# Patient Record
Sex: MALE | Race: BLACK OR AFRICAN AMERICAN | URBAN - METROPOLITAN AREA
[De-identification: names, ages, dates, MRNs, and addresses within clinical notes are randomized per-mention and may not be internally consistent; named-entity substitution may affect disease eponyms.]

---

## 2019-12-31 ENCOUNTER — HISTORICAL (OUTPATIENT)
Dept: ADMINISTRATIVE | Facility: HOSPITAL | Age: 44
End: 2019-12-31

## 2019-12-31 LAB
ABS NEUT (OLG): 4.28 X10(3)/MCL (ref 2.1–9.2)
ALBUMIN SERPL-MCNC: 3.4 GM/DL (ref 3.4–5)
ALBUMIN/GLOB SERPL: 1.1 {RATIO}
ALP SERPL-CCNC: 61 UNIT/L (ref 50–136)
ALT SERPL-CCNC: 27 UNIT/L (ref 12–78)
APPEARANCE, UA: CLEAR
AST SERPL-CCNC: 10 UNIT/L (ref 15–37)
BACTERIA SPEC CULT: ABNORMAL /HPF
BASOPHILS # BLD AUTO: 0 X10(3)/MCL (ref 0–0.2)
BASOPHILS NFR BLD AUTO: 1 %
BILIRUB SERPL-MCNC: 0.5 MG/DL (ref 0.2–1)
BILIRUB UR QL STRIP: NEGATIVE
BILIRUBIN DIRECT+TOT PNL SERPL-MCNC: 0.1 MG/DL (ref 0–0.2)
BILIRUBIN DIRECT+TOT PNL SERPL-MCNC: 0.4 MG/DL (ref 0–0.8)
BUN SERPL-MCNC: 20 MG/DL (ref 7–18)
CALCIUM SERPL-MCNC: 9.7 MG/DL (ref 8.5–10.1)
CHLORIDE SERPL-SCNC: 108 MMOL/L (ref 98–107)
CO2 SERPL-SCNC: 25 MMOL/L (ref 21–32)
COLOR UR: YELLOW
CREAT SERPL-MCNC: 0.96 MG/DL (ref 0.7–1.3)
CREAT UR-MCNC: 180 MG/DL
EOSINOPHIL # BLD AUTO: 0.1 X10(3)/MCL (ref 0–0.9)
EOSINOPHIL NFR BLD AUTO: 2 %
ERYTHROCYTE [DISTWIDTH] IN BLOOD BY AUTOMATED COUNT: 14 % (ref 11.5–17)
EST. AVERAGE GLUCOSE BLD GHB EST-MCNC: 134 MG/DL
GLOBULIN SER-MCNC: 3.1 GM/DL (ref 2.4–3.5)
GLUCOSE (UA): NEGATIVE
GLUCOSE SERPL-MCNC: 122 MG/DL (ref 74–106)
HBA1C MFR BLD: 6.3 % (ref 4.2–6.3)
HCT VFR BLD AUTO: 44.9 % (ref 42–52)
HGB BLD-MCNC: 15.1 GM/DL (ref 14–18)
HGB UR QL STRIP: NEGATIVE
KETONES UR QL STRIP: NEGATIVE
LEUKOCYTE ESTERASE UR QL STRIP: ABNORMAL
LYMPHOCYTES # BLD AUTO: 2.4 X10(3)/MCL (ref 0.6–4.6)
LYMPHOCYTES NFR BLD AUTO: 32 %
MCH RBC QN AUTO: 30.9 PG (ref 27–31)
MCHC RBC AUTO-ENTMCNC: 33.6 GM/DL (ref 33–36)
MCV RBC AUTO: 91.8 FL (ref 80–94)
MICROALBUMIN UR-MCNC: 1 MG/DL
MICROALBUMIN/CREAT RATIO PNL UR: 5.6 MG/GM CR (ref 0–30)
MONOCYTES # BLD AUTO: 0.7 X10(3)/MCL (ref 0.1–1.3)
MONOCYTES NFR BLD AUTO: 9 %
NEUTROPHILS # BLD AUTO: 4.28 X10(3)/MCL (ref 2.1–9.2)
NEUTROPHILS NFR BLD AUTO: 56 %
NITRITE UR QL STRIP: NEGATIVE
PH UR STRIP: 5.5 [PH] (ref 5–9)
PLATELET # BLD AUTO: 335 X10(3)/MCL (ref 130–400)
PMV BLD AUTO: 10.7 FL (ref 9.4–12.4)
POTASSIUM SERPL-SCNC: 3.9 MMOL/L (ref 3.5–5.1)
PROT SERPL-MCNC: 6.5 GM/DL (ref 6.4–8.2)
PROT UR QL STRIP: NEGATIVE
RBC # BLD AUTO: 4.89 X10(6)/MCL (ref 4.7–6.1)
RBC #/AREA URNS HPF: ABNORMAL /[HPF]
SODIUM SERPL-SCNC: 138 MMOL/L (ref 136–145)
SP GR UR STRIP: 1.02 (ref 1–1.03)
SQUAMOUS EPITHELIAL, UA: ABNORMAL
TSH SERPL-ACNC: 1.35 MIU/L (ref 0.36–3.74)
UROBILINOGEN UR STRIP-ACNC: 1
WBC # SPEC AUTO: 7.6 X10(3)/MCL (ref 4.5–11.5)
WBC #/AREA URNS HPF: 14 /HPF (ref 0–3)

## 2020-01-03 ENCOUNTER — HISTORICAL (OUTPATIENT)
Dept: ADMINISTRATIVE | Facility: HOSPITAL | Age: 45
End: 2020-01-03

## 2020-01-03 LAB
CHOLEST SERPL-MCNC: 208 MG/DL (ref 0–200)
CHOLEST/HDLC SERPL: 6.1 {RATIO} (ref 0–5)
HDLC SERPL-MCNC: 34 MG/DL (ref 35–60)
LDLC SERPL CALC-MCNC: 131 MG/DL (ref 0–129)
TRIGL SERPL-MCNC: 215 MG/DL (ref 30–150)
VLDLC SERPL CALC-MCNC: 43 MG/DL

## 2020-02-19 ENCOUNTER — HISTORICAL (OUTPATIENT)
Dept: ADMINISTRATIVE | Facility: HOSPITAL | Age: 45
End: 2020-02-19

## 2022-04-10 ENCOUNTER — HISTORICAL (OUTPATIENT)
Dept: ADMINISTRATIVE | Facility: HOSPITAL | Age: 47
End: 2022-04-10

## 2022-04-24 VITALS
WEIGHT: 281.31 LBS | BODY MASS INDEX: 39.38 KG/M2 | OXYGEN SATURATION: 93 % | DIASTOLIC BLOOD PRESSURE: 100 MMHG | HEIGHT: 71 IN | SYSTOLIC BLOOD PRESSURE: 144 MMHG

## 2024-09-30 ENCOUNTER — OFFICE VISIT (OUTPATIENT)
Dept: URGENT CARE | Facility: CLINIC | Age: 49
End: 2024-09-30

## 2024-09-30 VITALS
BODY MASS INDEX: 39.11 KG/M2 | HEIGHT: 71 IN | OXYGEN SATURATION: 98 % | SYSTOLIC BLOOD PRESSURE: 153 MMHG | TEMPERATURE: 98 F | DIASTOLIC BLOOD PRESSURE: 114 MMHG | HEART RATE: 90 BPM | RESPIRATION RATE: 18 BRPM | WEIGHT: 279.38 LBS

## 2024-09-30 DIAGNOSIS — R03.0 ELEVATED BLOOD PRESSURE READING: Primary | ICD-10-CM

## 2024-09-30 PROCEDURE — 99215 OFFICE O/P EST HI 40 MIN: CPT | Mod: PBBFAC | Performed by: FAMILY MEDICINE

## 2024-09-30 PROCEDURE — 99203 OFFICE O/P NEW LOW 30 MIN: CPT | Mod: S$PBB,,, | Performed by: FAMILY MEDICINE

## 2024-09-30 RX ORDER — AMLODIPINE BESYLATE 5 MG/1
5 TABLET ORAL DAILY
Qty: 30 TABLET | Refills: 3 | Status: SHIPPED | OUTPATIENT
Start: 2024-09-30

## 2024-09-30 NOTE — PROGRESS NOTES
"Subjective:       Patient ID: Yvonne Pierre is a 49 y.o. male.    Vitals:  height is 5' 11" (1.803 m) and weight is 126.7 kg (279 lb 6.4 oz). His oral temperature is 97.7 °F (36.5 °C). His blood pressure is 153/114 (abnormal) and his pulse is 90. His respiration is 18 and oxygen saturation is 98%.     Chief Complaint: Hypertension (Patient states had a work physical that he failed x 3 days. States does not take HTN or Diabetes meds. Needs PCP.)    Patient presents urgent care clinic requesting a PCP referral.  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  States a history of hypertension, off medications for quite some time.  Currently he is asymptomatic.  Denies chest pain or shortness on breath.  No headache or neuro symptoms.    All other systems are negative    Chart reviewed    Objective:   Physical Exam   Constitutional: He appears well-developed.  Non-toxic appearance. He does not appear ill. No distress.   Cardiovascular: Regular rhythm.   Pulmonary/Chest: Effort normal and breath sounds normal.   Abdominal: He exhibits no distension. Soft. There is no abdominal tenderness.   Musculoskeletal: Normal range of motion.         General: Normal range of motion.   Skin: Skin is warm, dry and not diaphoretic.   Nursing note and vitals reviewed.        Assessment:     1. Elevated blood pressure reading            Plan:   It sounds like he was on Norvasc in the past.  Will restart.  Refer to establish PCP for further evaluation and management.  ER precautions      Elevated blood pressure reading  -     amLODIPine (NORVASC) 5 MG tablet; Take 1 tablet (5 mg total) by mouth once daily.  Dispense: 30 tablet; Refill: 3  -     Ambulatory referral/consult to Internal Medicine        Please note: This chart was completed via voice to text dictation. It may contain typographical/word recognition errors. If there are any questions, please contact the provider for final " clarification.

## 2024-11-06 ENCOUNTER — OFFICE VISIT (OUTPATIENT)
Dept: INTERNAL MEDICINE | Facility: CLINIC | Age: 49
End: 2024-11-06
Payer: COMMERCIAL

## 2024-11-06 ENCOUNTER — LAB VISIT (OUTPATIENT)
Dept: LAB | Facility: HOSPITAL | Age: 49
End: 2024-11-06
Attending: NURSE PRACTITIONER
Payer: COMMERCIAL

## 2024-11-06 VITALS
OXYGEN SATURATION: 96 % | SYSTOLIC BLOOD PRESSURE: 158 MMHG | TEMPERATURE: 98 F | DIASTOLIC BLOOD PRESSURE: 98 MMHG | RESPIRATION RATE: 20 BRPM | WEIGHT: 274 LBS | BODY MASS INDEX: 38.36 KG/M2 | HEIGHT: 71 IN | HEART RATE: 79 BPM

## 2024-11-06 DIAGNOSIS — R73.9 ELEVATED BLOOD SUGAR: Primary | ICD-10-CM

## 2024-11-06 DIAGNOSIS — I10 PRIMARY HYPERTENSION: ICD-10-CM

## 2024-11-06 DIAGNOSIS — E89.0 HISTORY OF PARTIAL THYROIDECTOMY: ICD-10-CM

## 2024-11-06 DIAGNOSIS — E66.812 CLASS 2 SEVERE OBESITY WITH SERIOUS COMORBIDITY AND BODY MASS INDEX (BMI) OF 38.0 TO 38.9 IN ADULT, UNSPECIFIED OBESITY TYPE: ICD-10-CM

## 2024-11-06 DIAGNOSIS — R01.1 HEART MURMUR: ICD-10-CM

## 2024-11-06 DIAGNOSIS — Z11.9 SCREENING EXAMINATION FOR INFECTIOUS DISEASE: ICD-10-CM

## 2024-11-06 DIAGNOSIS — R73.9 ELEVATED BLOOD SUGAR: ICD-10-CM

## 2024-11-06 DIAGNOSIS — Z12.11 COLON CANCER SCREENING: ICD-10-CM

## 2024-11-06 DIAGNOSIS — E66.01 CLASS 2 SEVERE OBESITY WITH SERIOUS COMORBIDITY AND BODY MASS INDEX (BMI) OF 38.0 TO 38.9 IN ADULT, UNSPECIFIED OBESITY TYPE: ICD-10-CM

## 2024-11-06 DIAGNOSIS — Z00.00 WELLNESS EXAMINATION: ICD-10-CM

## 2024-11-06 DIAGNOSIS — Z12.5 PROSTATE CANCER SCREENING: ICD-10-CM

## 2024-11-06 LAB
25(OH)D3+25(OH)D2 SERPL-MCNC: 15 NG/ML (ref 30–80)
ALBUMIN SERPL-MCNC: 3.7 G/DL (ref 3.5–5)
ALBUMIN/GLOB SERPL: 1 RATIO (ref 1.1–2)
ALP SERPL-CCNC: 56 UNIT/L (ref 40–150)
ALT SERPL-CCNC: 18 UNIT/L (ref 0–55)
ANION GAP SERPL CALC-SCNC: 9 MEQ/L
AST SERPL-CCNC: 17 UNIT/L (ref 5–34)
BACTERIA #/AREA URNS AUTO: ABNORMAL /HPF
BASOPHILS # BLD AUTO: 0.07 X10(3)/MCL
BASOPHILS NFR BLD AUTO: 0.9 %
BILIRUB SERPL-MCNC: 0.5 MG/DL
BILIRUB UR QL STRIP.AUTO: NEGATIVE
BUN SERPL-MCNC: 10.3 MG/DL (ref 8.4–25.7)
CALCIUM SERPL-MCNC: 9.4 MG/DL (ref 8.4–10.2)
CHLORIDE SERPL-SCNC: 102 MMOL/L (ref 98–107)
CHOLEST SERPL-MCNC: 205 MG/DL
CHOLEST/HDLC SERPL: 5 {RATIO} (ref 0–5)
CLARITY UR: CLEAR
CO2 SERPL-SCNC: 25 MMOL/L (ref 22–29)
COLOR UR AUTO: ABNORMAL
CREAT SERPL-MCNC: 1.06 MG/DL (ref 0.72–1.25)
CREAT UR-MCNC: 172.7 MG/DL (ref 63–166)
CREAT/UREA NIT SERPL: 10
EOSINOPHIL # BLD AUTO: 0.1 X10(3)/MCL (ref 0–0.9)
EOSINOPHIL NFR BLD AUTO: 1.3 %
ERYTHROCYTE [DISTWIDTH] IN BLOOD BY AUTOMATED COUNT: 12.3 % (ref 11.5–17)
EST. AVERAGE GLUCOSE BLD GHB EST-MCNC: 226 MG/DL
GFR SERPLBLD CREATININE-BSD FMLA CKD-EPI: >60 ML/MIN/1.73/M2
GLOBULIN SER-MCNC: 3.6 GM/DL (ref 2.4–3.5)
GLUCOSE SERPL-MCNC: 220 MG/DL (ref 74–100)
GLUCOSE UR QL STRIP: ABNORMAL
HBA1C MFR BLD: 9.5 %
HBV SURFACE AG SERPL QL IA: NONREACTIVE
HCT VFR BLD AUTO: 44.7 % (ref 42–52)
HCV AB SERPL QL IA: NONREACTIVE
HDLC SERPL-MCNC: 40 MG/DL (ref 35–60)
HGB BLD-MCNC: 15.2 G/DL (ref 14–18)
HGB UR QL STRIP: NEGATIVE
HIV 1+2 AB+HIV1 P24 AG SERPL QL IA: NONREACTIVE
HYALINE CASTS #/AREA URNS LPF: ABNORMAL /LPF
IMM GRANULOCYTES # BLD AUTO: 0.02 X10(3)/MCL (ref 0–0.04)
IMM GRANULOCYTES NFR BLD AUTO: 0.3 %
KETONES UR QL STRIP: ABNORMAL
LDLC SERPL CALC-MCNC: 89 MG/DL (ref 50–140)
LEUKOCYTE ESTERASE UR QL STRIP: 250
LYMPHOCYTES # BLD AUTO: 2.3 X10(3)/MCL (ref 0.6–4.6)
LYMPHOCYTES NFR BLD AUTO: 31 %
MCH RBC QN AUTO: 30.3 PG (ref 27–31)
MCHC RBC AUTO-ENTMCNC: 34 G/DL (ref 33–36)
MCV RBC AUTO: 89.2 FL (ref 80–94)
MICROALBUMIN UR-MCNC: 27.7 UG/ML
MICROALBUMIN/CREAT RATIO PNL UR: 16 MG/GM CR (ref 0–30)
MONOCYTES # BLD AUTO: 0.71 X10(3)/MCL (ref 0.1–1.3)
MONOCYTES NFR BLD AUTO: 9.6 %
MUCOUS THREADS URNS QL MICRO: ABNORMAL /LPF
NEUTROPHILS # BLD AUTO: 4.21 X10(3)/MCL (ref 2.1–9.2)
NEUTROPHILS NFR BLD AUTO: 56.9 %
NITRITE UR QL STRIP: NEGATIVE
NRBC BLD AUTO-RTO: 0 %
PH UR STRIP: 5.5 [PH]
PLATELET # BLD AUTO: 313 X10(3)/MCL (ref 130–400)
PMV BLD AUTO: 10.3 FL (ref 7.4–10.4)
POTASSIUM SERPL-SCNC: 4 MMOL/L (ref 3.5–5.1)
PROT SERPL-MCNC: 7.3 GM/DL (ref 6.4–8.3)
PROT UR QL STRIP: ABNORMAL
PSA SERPL-MCNC: 0.84 NG/ML
RBC # BLD AUTO: 5.01 X10(6)/MCL (ref 4.7–6.1)
RBC #/AREA URNS AUTO: ABNORMAL /HPF
SODIUM SERPL-SCNC: 136 MMOL/L (ref 136–145)
SP GR UR STRIP.AUTO: 1.02 (ref 1–1.03)
SQUAMOUS #/AREA URNS LPF: ABNORMAL /HPF
T PALLIDUM AB SER QL: NONREACTIVE
TRIGL SERPL-MCNC: 378 MG/DL (ref 34–140)
TSH SERPL-ACNC: 0.74 UIU/ML (ref 0.35–4.94)
UROBILINOGEN UR STRIP-ACNC: NORMAL
VLDLC SERPL CALC-MCNC: 76 MG/DL
WBC # BLD AUTO: 7.41 X10(3)/MCL (ref 4.5–11.5)
WBC #/AREA URNS AUTO: ABNORMAL /HPF

## 2024-11-06 PROCEDURE — 87340 HEPATITIS B SURFACE AG IA: CPT

## 2024-11-06 PROCEDURE — 84443 ASSAY THYROID STIM HORMONE: CPT

## 2024-11-06 PROCEDURE — 82570 ASSAY OF URINE CREATININE: CPT

## 2024-11-06 PROCEDURE — 84153 ASSAY OF PSA TOTAL: CPT

## 2024-11-06 PROCEDURE — 86803 HEPATITIS C AB TEST: CPT

## 2024-11-06 PROCEDURE — 83036 HEMOGLOBIN GLYCOSYLATED A1C: CPT

## 2024-11-06 PROCEDURE — 86780 TREPONEMA PALLIDUM: CPT

## 2024-11-06 PROCEDURE — 82306 VITAMIN D 25 HYDROXY: CPT

## 2024-11-06 PROCEDURE — 85025 COMPLETE CBC W/AUTO DIFF WBC: CPT

## 2024-11-06 PROCEDURE — 80053 COMPREHEN METABOLIC PANEL: CPT

## 2024-11-06 PROCEDURE — 80061 LIPID PANEL: CPT

## 2024-11-06 PROCEDURE — 87389 HIV-1 AG W/HIV-1&-2 AB AG IA: CPT

## 2024-11-06 PROCEDURE — 36415 COLL VENOUS BLD VENIPUNCTURE: CPT

## 2024-11-06 PROCEDURE — 99214 OFFICE O/P EST MOD 30 MIN: CPT | Mod: PBBFAC | Performed by: NURSE PRACTITIONER

## 2024-11-06 PROCEDURE — 81015 MICROSCOPIC EXAM OF URINE: CPT

## 2024-11-06 NOTE — ASSESSMENT & PLAN NOTE
BMI 38.22  Educated on increased risk of disease s/t obesity.  Educated on health benefits of at least 5 days/ week of 30 minutes moderate intensity exercise (brisk walking) and 2 or more days/ week of muscle strength activities (as tolerated).  Eat well balanced diet of fresh fruits/ vegetables, whole grains, lean meats and limit high carbohydrate foods.

## 2024-11-06 NOTE — ASSESSMENT & PLAN NOTE
BP Readings from Last 3 Encounters:   11/06/24 (!) 158/98   09/30/24 (!) 153/114   06/03/20 (!) 144/100   Asymptomatic. Did not take medication this morning.  Follow low sodium diet, < 2 gm/day (avoid high salty foods such as processed meats/ sausage/grimaldo/ sandwich meat, chips, pickles, cheese, crackers and soft drinks/ electrolyte replacement drinks).  Avoid tobacco/ alcohol use  Educated on health benefits of at least 5 days/ week of 30 minutes moderate intensity exercise (brisk walking) and 2 or more days/ week of muscle strength activities  Daily ASA 81 mg for CV prevention  Continue current medication regimen  Has home BP monitor

## 2024-11-06 NOTE — ASSESSMENT & PLAN NOTE
Asymptomatic. Denies prior diagnosis. Echo ordered   Emphasized importance of antihypertensive compliance
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
## 2024-11-06 NOTE — ASSESSMENT & PLAN NOTE
New onset diabetes, need labs to confirm, do not have outside records  Per pt A1c around 10% and glucose in urine > 500  Has home glucometer but not checking sugar  Lab Results   Component Value Date    HGBA1C 6.3 12/31/2019     CBGs: not checking  Hypoglycemia episodes:  Microalbumin:   Lab Results   Component Value Date    MICALBCREAT 5.6 12/31/2019     Educated on ADA diet: eliminate/decrease high carbohydrate foods (rice, pasta, bread, potatoes (french fries, chips), candy, sweets, fruit juices, canned fruit, junk food, crackers, carbonated beverages)  Educated on health benefits of at least 5 days/ week of 30 minutes moderate intensity exercise (brisk walking) and 2 or more days/ week of muscle strength activities  Avoid alcohol or tobacco use  Eye exam:  Foot exam:  Normal sugar , < 200

## 2024-11-18 ENCOUNTER — TELEPHONE (OUTPATIENT)
Dept: INTERNAL MEDICINE | Facility: CLINIC | Age: 49
End: 2024-11-18
Payer: COMMERCIAL

## 2024-11-18 DIAGNOSIS — E11.9 NEW ONSET TYPE 2 DIABETES MELLITUS: Primary | ICD-10-CM

## 2024-11-18 RX ORDER — METFORMIN HYDROCHLORIDE 500 MG/1
500 TABLET ORAL 2 TIMES DAILY WITH MEALS
Qty: 180 TABLET | Refills: 0 | Status: SHIPPED | OUTPATIENT
Start: 2024-11-18 | End: 2025-11-18

## 2024-11-18 NOTE — TELEPHONE ENCOUNTER
----- Message from Gayle Sam NP sent at 11/18/2024 10:46 AM CST -----  Please notify pt lab and urine test results reviewed. A1c is 9.5% which means pt is diabetic (an A1c > or = 6.5% is considered diabetic). Encourage patient to make changes with dietary intake and lifestyle modifications (encourage exercise as tolerated) and encourage to check blood sugars to keep log. Recommend he start taking prescription medication called Metformin to help lower blood sugars. Medication must be taken with food as it can lower blood sugar especially if taken without food. Keep appointment as scheduled and will discuss further at upcoming visit.

## 2024-11-20 NOTE — TELEPHONE ENCOUNTER
Received return call back informed that A1C  9.5% meaning is a diabetic. Metformin 500 mg was sent to the pharmacy to be taken twice a day . Encouraged lifestyle changes in diet and exercise. Instructed to avoid the whites= rice, potatoes, pasta, bread, sugar. Read label  to decrease intake of  carbohydrates. Instructed to check blood sugar daily and keep a log. Keep all appointments. He voiced understanding.

## 2024-11-20 NOTE — TELEPHONE ENCOUNTER
Second attempt made to contact- unsuccessful. Called father number listed no answer left message to have son contact the clinic.

## 2024-12-11 ENCOUNTER — OFFICE VISIT (OUTPATIENT)
Dept: INTERNAL MEDICINE | Facility: CLINIC | Age: 49
End: 2024-12-11
Payer: COMMERCIAL

## 2024-12-11 DIAGNOSIS — E55.9 VITAMIN D DEFICIENCY: ICD-10-CM

## 2024-12-11 DIAGNOSIS — E11.9 TYPE 2 DIABETES MELLITUS WITHOUT COMPLICATION, WITH LONG-TERM CURRENT USE OF INSULIN: Primary | ICD-10-CM

## 2024-12-11 DIAGNOSIS — Z79.4 TYPE 2 DIABETES MELLITUS WITHOUT COMPLICATION, WITH LONG-TERM CURRENT USE OF INSULIN: Primary | ICD-10-CM

## 2024-12-11 PROCEDURE — 1159F MED LIST DOCD IN RCRD: CPT | Mod: CPTII,95,, | Performed by: NURSE PRACTITIONER

## 2024-12-11 PROCEDURE — 3066F NEPHROPATHY DOC TX: CPT | Mod: CPTII,95,, | Performed by: NURSE PRACTITIONER

## 2024-12-11 PROCEDURE — 3061F NEG MICROALBUMINURIA REV: CPT | Mod: CPTII,95,, | Performed by: NURSE PRACTITIONER

## 2024-12-11 PROCEDURE — 99214 OFFICE O/P EST MOD 30 MIN: CPT | Mod: 95,,, | Performed by: NURSE PRACTITIONER

## 2024-12-11 PROCEDURE — 1160F RVW MEDS BY RX/DR IN RCRD: CPT | Mod: CPTII,95,, | Performed by: NURSE PRACTITIONER

## 2024-12-11 PROCEDURE — 3046F HEMOGLOBIN A1C LEVEL >9.0%: CPT | Mod: CPTII,95,, | Performed by: NURSE PRACTITIONER

## 2024-12-11 RX ORDER — LANCETS
EACH MISCELLANEOUS
Qty: 100 EACH | Refills: 3 | Status: SHIPPED | OUTPATIENT
Start: 2024-12-11

## 2024-12-11 RX ORDER — CHOLECALCIFEROL (VITAMIN D3) 1250 MCG
1250 TABLET ORAL
Qty: 12 TABLET | Refills: 0 | Status: SHIPPED | OUTPATIENT
Start: 2024-12-11

## 2024-12-11 RX ORDER — INSULIN PUMP SYRINGE, 3 ML
EACH MISCELLANEOUS
Qty: 1 EACH | Refills: 0 | Status: SHIPPED | OUTPATIENT
Start: 2024-12-11

## 2024-12-11 NOTE — ASSESSMENT & PLAN NOTE
Lab Results   Component Value Date    HGBA1C 9.5 (H) 11/06/2024     CBGs:  Hypoglycemia episodes:  Microalbumin:   Lab Results   Component Value Date    MICALBCREAT 16.0 11/06/2024     Educated on ADA diet: eliminate/decrease high carbohydrate foods (rice, pasta, bread, potatoes (french fries, chips), candy, sweets, fruit juices, canned fruit, junk food, crackers, carbonated beverages)  Educated on health benefits of at least 5 days/ week of 30 minutes moderate intensity exercise (brisk walking) and 2 or more days/ week of muscle strength activities  Avoid alcohol or tobacco use  Eye exam:  Foot exam:  Per recommendations, continue with ACEI/ARB, Daily ASA 81 mg for CV prevention, Statin therapy  Continue with current regimen- Metformin rx last month, tolerating without w/e  Rx for supplies  F/u 2 mo with recheck of labs

## 2024-12-11 NOTE — ASSESSMENT & PLAN NOTE
Lab Results   Component Value Date    [id number] 15 (L) 11/06/2024   Rx vitamin D 3 21085 IU once weekly x 12 weeks  Educated on increasing foods high in Vitamin D such as mushrooms, fish oil, cod liver, salmon, tuna, egg yolks, milk fortified with Vitamin D and foods fortified with Vitamin D such as cereals and oatmeal.  Daily supplementation of Vitamin D 2000 IU daily in addition to Calcium supplementation 1000 mg- 1200 mg daily.

## 2024-12-11 NOTE — PROGRESS NOTES
Attempted to reach patient for virtual. No answer and unable to leave VM. Sent direct link for virtual. Patient has not arrived for patient 12/11/24 1410.    The patient location is: vehicle  The chief complaint leading to consultation is: lab review    Visit type: audiovisual    Face to Face time with patient: 7 minutes  11 minutes of total time spent on the encounter, which includes face to face time and non-face to face time preparing to see the patient (eg, review of tests), Obtaining and/or reviewing separately obtained history, Documenting clinical information in the electronic or other health record, Independently interpreting results (not separately reported) and communicating results to the patient/family/caregiver, or Care coordination (not separately reported).         Each patient to whom he or she provides medical services by telemedicine is:  (1) informed of the relationship between the physician and patient and the respective role of any other health care provider with respect to management of the patient; and (2) notified that he or she may decline to receive medical services by telemedicine and may withdraw from such care at any time.    Notes: Initial visit 11/6/24. Pt for follow up and lab review. A1c 9.5%. Taking Metformin and denies any associated s/e. Needs refill of test strips. Vit D 15. LDL 89 and trig 378. Denies any other concerns/ complaints.     Medication List with Changes/Refills   New Medications    BLOOD SUGAR DIAGNOSTIC STRP    To check BG one times daily, to use with insurance preferred meter    BLOOD-GLUCOSE METER KIT    To check BG one times daily, to use with insurance preferred meter    CHOLECALCIFEROL, VITAMIN D3, 1,250 MCG (50,000 UNIT) TAB    Take 1,250 mcg by mouth every 7 days.    LANCETS MISC    To check BG one times daily, to use with insurance preferred meter   Current Medications    AMLODIPINE (NORVASC) 5 MG TABLET    Take 1 tablet (5 mg total) by mouth once daily.     METFORMIN (GLUCOPHAGE) 500 MG TABLET    Take 1 tablet (500 mg total) by mouth 2 (two) times daily with meals.     Problem List Items Addressed This Visit          Endocrine    Type 2 diabetes mellitus without complication, with long-term current use of insulin - Primary    Current Assessment & Plan     Lab Results   Component Value Date    HGBA1C 9.5 (H) 11/06/2024     CBGs:  Hypoglycemia episodes:  Microalbumin:   Lab Results   Component Value Date    MICALBCREAT 16.0 11/06/2024     Educated on ADA diet: eliminate/decrease high carbohydrate foods (rice, pasta, bread, potatoes (french fries, chips), candy, sweets, fruit juices, canned fruit, junk food, crackers, carbonated beverages)  Educated on health benefits of at least 5 days/ week of 30 minutes moderate intensity exercise (brisk walking) and 2 or more days/ week of muscle strength activities  Avoid alcohol or tobacco use  Eye exam:  Foot exam:  Per recommendations, continue with ACEI/ARB, Daily ASA 81 mg for CV prevention, Statin therapy  Continue with current regimen- Metformin rx last month, tolerating without w/e  Rx for supplies  F/u 2 mo with recheck of labs            Relevant Medications    blood-glucose meter kit    lancets Misc    blood sugar diagnostic Strp    Other Relevant Orders    Hemoglobin A1C    Comprehensive Metabolic Panel    Vitamin D deficiency    Current Assessment & Plan     Lab Results   Component Value Date    [id number] 15 (L) 11/06/2024   Rx vitamin D 3 63303 IU once weekly x 12 weeks  Educated on increasing foods high in Vitamin D such as mushrooms, fish oil, cod liver, salmon, tuna, egg yolks, milk fortified with Vitamin D and foods fortified with Vitamin D such as cereals and oatmeal.  Daily supplementation of Vitamin D 2000 IU daily in addition to Calcium supplementation 1000 mg- 1200 mg daily.           Relevant Medications    cholecalciferol, vitamin D3, 1,250 mcg (50,000 unit) Tab     Follow up in about 2 months (around  2/11/2025) for DM with fasting labs .

## 2024-12-19 ENCOUNTER — TELEPHONE (OUTPATIENT)
Dept: INTERNAL MEDICINE | Facility: CLINIC | Age: 49
End: 2024-12-19
Payer: COMMERCIAL

## 2024-12-19 ENCOUNTER — PATIENT MESSAGE (OUTPATIENT)
Dept: INTERNAL MEDICINE | Facility: CLINIC | Age: 49
End: 2024-12-19
Payer: COMMERCIAL

## 2024-12-19 DIAGNOSIS — Z79.4 TYPE 2 DIABETES MELLITUS WITHOUT COMPLICATION, WITH LONG-TERM CURRENT USE OF INSULIN: Primary | ICD-10-CM

## 2024-12-19 DIAGNOSIS — E11.9 TYPE 2 DIABETES MELLITUS WITHOUT COMPLICATION, WITH LONG-TERM CURRENT USE OF INSULIN: Primary | ICD-10-CM

## 2024-12-19 DIAGNOSIS — I10 PRIMARY HYPERTENSION: ICD-10-CM

## 2024-12-19 RX ORDER — METFORMIN HYDROCHLORIDE 1000 MG/1
1000 TABLET ORAL 2 TIMES DAILY WITH MEALS
Qty: 180 TABLET | Refills: 1 | Status: SHIPPED | OUTPATIENT
Start: 2024-12-19 | End: 2025-12-19

## 2024-12-19 RX ORDER — AMLODIPINE BESYLATE 10 MG/1
10 TABLET ORAL DAILY
Qty: 90 TABLET | Refills: 1 | Status: SHIPPED | OUTPATIENT
Start: 2024-12-19 | End: 2025-12-19

## 2024-12-19 NOTE — TELEPHONE ENCOUNTER
Please contact pt to schedule follow up visit- none noted in the chart.      Last seen virtual visit 12/11/24 with 2 mo f/u for DM with fasting labs needed.

## 2025-03-09 ENCOUNTER — OFFICE VISIT (OUTPATIENT)
Dept: URGENT CARE | Facility: CLINIC | Age: 50
End: 2025-03-09
Payer: COMMERCIAL

## 2025-03-09 VITALS
WEIGHT: 267.38 LBS | TEMPERATURE: 98 F | DIASTOLIC BLOOD PRESSURE: 99 MMHG | RESPIRATION RATE: 16 BRPM | BODY MASS INDEX: 38.28 KG/M2 | SYSTOLIC BLOOD PRESSURE: 155 MMHG | HEART RATE: 72 BPM | HEIGHT: 70 IN | OXYGEN SATURATION: 99 %

## 2025-03-09 DIAGNOSIS — K42.9 UMBILICAL HERNIA WITHOUT OBSTRUCTION AND WITHOUT GANGRENE: Primary | ICD-10-CM

## 2025-03-09 PROCEDURE — 99213 OFFICE O/P EST LOW 20 MIN: CPT | Mod: S$PBB,,,

## 2025-03-09 PROCEDURE — 99215 OFFICE O/P EST HI 40 MIN: CPT | Mod: PBBFAC

## 2025-03-09 NOTE — PROGRESS NOTES
"Subjective:       Patient ID: Yvonne iPerre is a 49 y.o. male.    Vitals:  height is 5' 9.69" (1.77 m) and weight is 121.3 kg (267 lb 6.4 oz). His temperature is 98.1 °F (36.7 °C). His blood pressure is 155/99 (abnormal) and his pulse is 72. His respiration is 16 and oxygen saturation is 99%.     Chief Complaint: Possible Hernia (X 5-6 months. PCP appt 3/26)    49-year-old male presents to the clinic with complaints of a possible umbilical hernia that began about 5 months ago.  Patient states that about 5 or 6 days ago he picked up something heavy and hernia got worse and was swelling and got hard to the touch.  Patient states that swelling has gone down some since it began.  Patient denies nausea, vomiting, diarrhea, fever.  Patient reports that his last bowel movement was yesterday and that it was formed and soft.  Patient reports abdominal pain around area that he rates a 3/10 on the pain scale.    All other systems are negative    Chart reviewed    Objective:   Physical Exam   Constitutional: He appears well-developed.  Non-toxic appearance. He does not appear ill. No distress.   Cardiovascular: Regular rhythm.   Pulmonary/Chest: Effort normal and breath sounds normal.   Abdominal: Bowel sounds are normal. He exhibits no distension. Soft. There is abdominal tenderness in the periumbilical area. There is no rebound, no guarding, no tenderness at McBurney's point, no left CVA tenderness and no right CVA tenderness. A hernia is present. Hernia confirmed positive in the umbilical area.      Comments: Large, hardened, umbilical hernia present to umbilical area   Musculoskeletal: Normal range of motion.         General: Normal range of motion.   Skin: Skin is warm, dry and not diaphoretic.   Nursing note and vitals reviewed.      Assessment:     1. Umbilical hernia without obstruction and without gangrene            Plan:   Follow-up with general surgery as soon as possible  Discussed signs and symptoms that would " warrant further evaluation in the emergency room such as nausea, vomiting, unable to have a bowel movement, severe abdominal pain, fever  Discussed surgical interventions for umbilical hernia patient verbalizes understanding      Umbilical hernia without obstruction and without gangrene  -     Ambulatory referral/consult to General Surgery        Please note: This chart was completed via voice to text dictation. It may contain typographical/word recognition errors. If there are any questions, please contact the provider for final clarification.

## 2025-03-26 ENCOUNTER — LAB VISIT (OUTPATIENT)
Dept: LAB | Facility: HOSPITAL | Age: 50
End: 2025-03-26
Attending: NURSE PRACTITIONER
Payer: COMMERCIAL

## 2025-03-26 ENCOUNTER — PATIENT MESSAGE (OUTPATIENT)
Dept: INTERNAL MEDICINE | Facility: CLINIC | Age: 50
End: 2025-03-26

## 2025-03-26 ENCOUNTER — OFFICE VISIT (OUTPATIENT)
Dept: INTERNAL MEDICINE | Facility: CLINIC | Age: 50
End: 2025-03-26
Payer: COMMERCIAL

## 2025-03-26 VITALS
RESPIRATION RATE: 18 BRPM | SYSTOLIC BLOOD PRESSURE: 131 MMHG | DIASTOLIC BLOOD PRESSURE: 92 MMHG | BODY MASS INDEX: 38.94 KG/M2 | WEIGHT: 272 LBS | TEMPERATURE: 98 F | HEART RATE: 93 BPM | OXYGEN SATURATION: 96 % | HEIGHT: 70 IN

## 2025-03-26 DIAGNOSIS — K42.9 UMBILICAL HERNIA WITHOUT OBSTRUCTION AND WITHOUT GANGRENE: ICD-10-CM

## 2025-03-26 DIAGNOSIS — Z79.4 TYPE 2 DIABETES MELLITUS WITHOUT COMPLICATION, WITH LONG-TERM CURRENT USE OF INSULIN: ICD-10-CM

## 2025-03-26 DIAGNOSIS — I10 PRIMARY HYPERTENSION: ICD-10-CM

## 2025-03-26 DIAGNOSIS — E11.9 TYPE 2 DIABETES MELLITUS WITHOUT COMPLICATION, WITHOUT LONG-TERM CURRENT USE OF INSULIN: Primary | ICD-10-CM

## 2025-03-26 DIAGNOSIS — E66.812 CLASS 2 SEVERE OBESITY WITH SERIOUS COMORBIDITY AND BODY MASS INDEX (BMI) OF 38.0 TO 38.9 IN ADULT, UNSPECIFIED OBESITY TYPE: ICD-10-CM

## 2025-03-26 DIAGNOSIS — E66.01 CLASS 2 SEVERE OBESITY WITH SERIOUS COMORBIDITY AND BODY MASS INDEX (BMI) OF 38.0 TO 38.9 IN ADULT, UNSPECIFIED OBESITY TYPE: ICD-10-CM
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
## 2025-03-26 DIAGNOSIS — L57.0 AK (ACTINIC KERATOSIS): ICD-10-CM

## 2025-03-26 DIAGNOSIS — E11.9 TYPE 2 DIABETES MELLITUS WITHOUT COMPLICATION, WITH LONG-TERM CURRENT USE OF INSULIN: ICD-10-CM

## 2025-03-26 DIAGNOSIS — S10.86XA INSECT BITE OF OTHER PART OF NECK, INITIAL ENCOUNTER: ICD-10-CM

## 2025-03-26 LAB
ALBUMIN SERPL-MCNC: 3.8 G/DL (ref 3.5–5)
ALBUMIN/GLOB SERPL: 0.8 RATIO (ref 1.1–2)
ALP SERPL-CCNC: 60 UNIT/L (ref 40–150)
ALT SERPL-CCNC: 18 UNIT/L (ref 0–55)
ANION GAP SERPL CALC-SCNC: 10 MEQ/L
AST SERPL-CCNC: 18 UNIT/L (ref 11–45)
BILIRUB SERPL-MCNC: 0.3 MG/DL
BUN SERPL-MCNC: 16.4 MG/DL (ref 8.4–25.7)
CALCIUM SERPL-MCNC: 9.5 MG/DL (ref 8.4–10.2)
CHLORIDE SERPL-SCNC: 102 MMOL/L (ref 98–107)
CO2 SERPL-SCNC: 23 MMOL/L (ref 22–29)
CREAT SERPL-MCNC: 1.28 MG/DL (ref 0.72–1.25)
CREAT/UREA NIT SERPL: 13
EST. AVERAGE GLUCOSE BLD GHB EST-MCNC: 165.7 MG/DL
GFR SERPLBLD CREATININE-BSD FMLA CKD-EPI: >60 ML/MIN/1.73/M2
GLOBULIN SER-MCNC: 4.5 GM/DL (ref 2.4–3.5)
GLUCOSE SERPL-MCNC: 171 MG/DL (ref 74–100)
HBA1C MFR BLD: 7.4 %
POTASSIUM SERPL-SCNC: 4 MMOL/L (ref 3.5–5.1)
PROT SERPL-MCNC: 8.3 GM/DL (ref 6.4–8.3)
SODIUM SERPL-SCNC: 135 MMOL/L (ref 136–145)

## 2025-03-26 PROCEDURE — 83036 HEMOGLOBIN GLYCOSYLATED A1C: CPT

## 2025-03-26 PROCEDURE — 80053 COMPREHEN METABOLIC PANEL: CPT

## 2025-03-26 PROCEDURE — 99214 OFFICE O/P EST MOD 30 MIN: CPT | Mod: PBBFAC | Performed by: NURSE PRACTITIONER

## 2025-03-26 PROCEDURE — 36415 COLL VENOUS BLD VENIPUNCTURE: CPT

## 2025-03-26 RX ORDER — LANCETS 30 GAUGE
EACH MISCELLANEOUS
COMMUNITY
Start: 2024-12-11

## 2025-03-26 RX ORDER — AMLODIPINE BESYLATE 10 MG/1
10 TABLET ORAL DAILY
Qty: 90 TABLET | Refills: 1 | Status: SHIPPED | OUTPATIENT
Start: 2025-03-26 | End: 2026-03-26

## 2025-03-26 NOTE — ASSESSMENT & PLAN NOTE
BMI 39.38  Educated on increased risk of disease s/t obesity.  Educated on health benefits of at least 5 days/ week of 30 minutes moderate intensity exercise (brisk walking) and 2 or more days/ week of muscle strength activities (as tolerated).  Eat well balanced diet of fresh fruits/ vegetables, whole grains, lean meats and limit high carbohydrate foods.

## 2025-03-26 NOTE — ASSESSMENT & PLAN NOTE
BP Readings from Last 3 Encounters:   03/26/25 (!) 131/92   03/09/25 (!) 155/99   11/06/24 (!) 158/98   Asymptomatic  Follow low sodium diet, < 2 gm/day (avoid high salty foods such as processed meats/ sausage/grimaldo/ sandwich meat, chips, pickles, cheese, crackers and soft drinks/ electrolyte replacement drinks).  Avoid tobacco/ alcohol use  Educated on health benefits of at least 5 days/ week of 30 minutes moderate intensity exercise (brisk walking) and 2 or more days/ week of muscle strength activities  Daily ASA 81 mg for CV prevention  Continue current medication regimen

## 2025-03-26 NOTE — ASSESSMENT & PLAN NOTE
[place] [facility] [place] referred to [place] clinic  Advised to avoid heavy lifting/ straining  ER precautions advised
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
## 2025-03-26 NOTE — ASSESSMENT & PLAN NOTE
Labs today   CBGs: 112, 120 per pt  Hypoglycemia episodes:  Microalbumin:   Lab Results   Component Value Date    MICALBCREAT 16.0 11/06/2024     Educated on ADA diet: eliminate/decrease high carbohydrate foods (rice, pasta, bread, potatoes (french fries, chips), candy, sweets, fruit juices, canned fruit, junk food, crackers, carbonated beverages)  Educated on health benefits of at least 5 days/ week of 30 minutes moderate intensity exercise (brisk walking) and 2 or more days/ week of muscle strength activities  Avoid alcohol or tobacco use  Eye exam:  Foot exam:  Per recommendations, continue with ACEI/ARB, Daily ASA 81 mg for CV prevention, Statin therapy  DC metformin- causing diarrhea and pt    Previously on Ozempic  Rx Ozempic 0.2 mg SC q7d x 4 weeks

## 2025-03-26 NOTE — ASSESSMENT & PLAN NOTE
Present for years with new lesion noted x few months   Itching and bleeding at times  Referral to FM

## 2025-03-27 ENCOUNTER — TELEPHONE (OUTPATIENT)
Dept: INTERNAL MEDICINE | Facility: CLINIC | Age: 50
End: 2025-03-27
Payer: COMMERCIAL

## 2025-03-27 RX ORDER — TIRZEPATIDE 2.5 MG/.5ML
2.5 INJECTION, SOLUTION SUBCUTANEOUS
Qty: 4 PEN | Refills: 1 | Status: SHIPPED | OUTPATIENT
Start: 2025-03-27

## 2025-03-27 NOTE — TELEPHONE ENCOUNTER
Received call from [employer]- Medication Access Specialist informing Ozempic  was denied, also noted patient requesting to change to Mounjaro. . Wanted to know should she start the appeal.?  Spoke to CRISTY Sam FNP informed - told no need for appeal, will bianchi for Mounjaro

## 2025-03-28 ENCOUNTER — TELEPHONE (OUTPATIENT)
Dept: INTERNAL MEDICINE | Facility: CLINIC | Age: 50
End: 2025-03-28
Payer: COMMERCIAL

## 2025-03-28 NOTE — TELEPHONE ENCOUNTER
Was informed by Fariba Plaza  Medication Access Specialist patients Mounjaro  PA was approved . Case ID [id number] approval until 03/28/2026.

## 2025-03-31 ENCOUNTER — PATIENT OUTREACH (OUTPATIENT)
Facility: CLINIC | Age: 50
End: 2025-03-31
Payer: COMMERCIAL

## 2025-03-31 NOTE — PROGRESS NOTES
Health Maintenance Topic(s) Outreach Outcomes & Actions Taken:    Eye Exam - Outreach Outcomes & Actions Taken  : Portal message sent..    Colorectal Cancer Screening - Outreach Outcomes & Actions Taken  : Cologuard returned 11/22/24 unable to be processed. Sample limit exceeded.     Blood Pressure - Outreach Outcomes & Actions Taken  : Unable to obtain remote BP, No answer. Portal message sent.    Medication Adherence / Statins - Outreach Outcomes & Actions Taken  : Sent Provider Message to Review to Evaluate Pt for Statin, Add Exclusion Dx Codes, Document Exclusion in Problem List, Change Statin Intensity Level to Moderate or High Intensity if Applicable      Additional Notes:  Attempt made to contact patient. No answer. Message left with name and phone number for callback. Portal msg sent.    Noted PA for mounjaro approved. LVM informing of approval and encouraging fill at pharmacy.     Annual Wellness Visit: Due     Next PCP F/U: 5/8/2025  SDOH Incomplete- financial, transportation & food   Health Maintenance Topics Overdue:  VBHM Score: 4   Colon Cancer Screening  Eye Exam  Foot Exam  Uncontrolled BP       HTN: uncontrolled    DM: Last A1c 7.4    Statin Therapy for prevention of CVD: Rx or exclusion needed.       Care Management, Digital Medicine, and/or Education Referrals      Next Steps - Referral Actions: Digital Medicine Outcomes and Actions Taken: Pt Declined or Not Eligible

## 2025-04-08 ENCOUNTER — TELEPHONE (OUTPATIENT)
Dept: INTERNAL MEDICINE | Facility: CLINIC | Age: 50
End: 2025-04-08
Payer: COMMERCIAL

## 2025-04-20 DIAGNOSIS — E66.812 CLASS 2 SEVERE OBESITY WITH SERIOUS COMORBIDITY AND BODY MASS INDEX (BMI) OF 38.0 TO 38.9 IN ADULT, UNSPECIFIED OBESITY TYPE: ICD-10-CM

## 2025-04-20 DIAGNOSIS — E11.9 TYPE 2 DIABETES MELLITUS WITHOUT COMPLICATION, WITHOUT LONG-TERM CURRENT USE OF INSULIN: ICD-10-CM

## 2025-04-20 DIAGNOSIS — E55.9 VITAMIN D DEFICIENCY: ICD-10-CM

## 2025-04-20 DIAGNOSIS — I10 PRIMARY HYPERTENSION: ICD-10-CM

## 2025-04-20 DIAGNOSIS — E66.01 CLASS 2 SEVERE OBESITY WITH SERIOUS COMORBIDITY AND BODY MASS INDEX (BMI) OF 38.0 TO 38.9 IN ADULT, UNSPECIFIED OBESITY TYPE: ICD-10-CM

## 2025-04-20 RX ORDER — AMLODIPINE BESYLATE 10 MG/1
10 TABLET ORAL DAILY
Qty: 90 TABLET | Refills: 1 | Status: CANCELLED | OUTPATIENT
Start: 2025-04-20 | End: 2026-04-20

## 2025-04-21 RX ORDER — TIRZEPATIDE 2.5 MG/.5ML
2.5 INJECTION, SOLUTION SUBCUTANEOUS
Qty: 4 PEN | Refills: 1 | OUTPATIENT
Start: 2025-04-21

## 2025-04-21 RX ORDER — CHOLECALCIFEROL (VITAMIN D3) 1250 MCG
1250 TABLET ORAL
Qty: 12 TABLET | Refills: 0 | OUTPATIENT
Start: 2025-04-21

## 2025-04-21 NOTE — TELEPHONE ENCOUNTER
Pharmacy requesting refill for pt's Vitamin D3, and  tirzepatide (MOUNJARO) 2.5 mg/0.5 mL PnIj     LOV: 3/26/25      NOV:5/8/25

## 2025-04-23 DIAGNOSIS — I10 PRIMARY HYPERTENSION: ICD-10-CM

## 2025-04-23 DIAGNOSIS — E11.9 TYPE 2 DIABETES MELLITUS WITHOUT COMPLICATION, WITHOUT LONG-TERM CURRENT USE OF INSULIN: ICD-10-CM

## 2025-04-23 DIAGNOSIS — E55.9 VITAMIN D DEFICIENCY: ICD-10-CM

## 2025-04-23 DIAGNOSIS — E66.812 CLASS 2 SEVERE OBESITY WITH SERIOUS COMORBIDITY AND BODY MASS INDEX (BMI) OF 38.0 TO 38.9 IN ADULT, UNSPECIFIED OBESITY TYPE: ICD-10-CM

## 2025-04-23 DIAGNOSIS — E66.01 CLASS 2 SEVERE OBESITY WITH SERIOUS COMORBIDITY AND BODY MASS INDEX (BMI) OF 38.0 TO 38.9 IN ADULT, UNSPECIFIED OBESITY TYPE: ICD-10-CM

## 2025-04-24 RX ORDER — AMLODIPINE BESYLATE 10 MG/1
10 TABLET ORAL DAILY
Qty: 90 TABLET | Refills: 1 | OUTPATIENT
Start: 2025-04-24 | End: 2026-04-24

## 2025-04-24 RX ORDER — CHOLECALCIFEROL (VITAMIN D3) 1250 MCG
1250 TABLET ORAL
Qty: 12 TABLET | Refills: 0 | OUTPATIENT
Start: 2025-04-24

## 2025-04-24 RX ORDER — TIRZEPATIDE 2.5 MG/.5ML
2.5 INJECTION, SOLUTION SUBCUTANEOUS
Qty: 4 PEN | Refills: 1 | OUTPATIENT
Start: 2025-04-24

## 2025-04-24 NOTE — TELEPHONE ENCOUNTER
Please inform pt no need for refill of vit D at this time. Will recheck with next blood draw.     Mounjaro should not need refill until I see him again. Please check with pt pharmacy regarding this.     Please let pt know. Thank you

## 2025-05-08 ENCOUNTER — OFFICE VISIT (OUTPATIENT)
Dept: INTERNAL MEDICINE | Facility: CLINIC | Age: 50
End: 2025-05-08
Payer: COMMERCIAL

## 2025-05-08 DIAGNOSIS — L57.0 AK (ACTINIC KERATOSIS): ICD-10-CM

## 2025-05-08 DIAGNOSIS — E11.9 TYPE 2 DIABETES MELLITUS WITHOUT COMPLICATION, WITHOUT LONG-TERM CURRENT USE OF INSULIN: Primary | ICD-10-CM

## 2025-05-08 DIAGNOSIS — I10 PRIMARY HYPERTENSION: ICD-10-CM

## 2025-05-08 DIAGNOSIS — E55.9 VITAMIN D DEFICIENCY: ICD-10-CM

## 2025-05-08 RX ORDER — TIRZEPATIDE 5 MG/.5ML
5 INJECTION, SOLUTION SUBCUTANEOUS
Qty: 4 PEN | Refills: 1 | Status: SHIPPED | OUTPATIENT
Start: 2025-05-08

## 2025-05-08 NOTE — ASSESSMENT & PLAN NOTE
Denies call to schedule appointment in specialty clinic. Will send referral to ENT. Denies any changes to area.

## 2025-05-08 NOTE — PROGRESS NOTES
Audio Only Telehealth Visit     The patient location is: home  The chief complaint leading to consultation is: follow up HTN, DM (tolerating Mounjaro well)  Visit type: Virtual visit with audio only (telephone)  Total time spent in medical discussion with patient: 11 minutes  Total time spent on date of the encounter:20 minutes       The reason for the audio only service rather than synchronous audio and video virtual visit was related to technical difficulties or patient preference/necessity.       Each patient to whom I provide medical services by telemedicine is:  (1) informed of the relationship between the physician and patient and the respective role of any other health care provider with respect to management of the patient; and (2) notified that they may decline to receive medical services by telemedicine and may withdraw from such care at any time. Patient verbally consented to receive this service via voice-only telephone call.       HPI: Pt for f/u for DM and Mounjaro. Tolerating without s/e. Denies any change in appetite or wt loss. Fasting , 130. Sometimes after he eats it could be 210, 200. BP has been good per pt. Still has areas to nose; never received call. Reviewed Vit D level and needs recheck with next blood work. No other concerns stated.        Assessment and plan:    Problem List Items Addressed This Visit          Derm    AK (actinic keratosis)    Current Assessment & Plan   Denies call to schedule appointment in specialty clinic. Will send referral to ENT. Denies any changes to area.         Relevant Orders    Ambulatory referral/consult to ENT       Cardiac/Vascular    Primary hypertension    Current Assessment & Plan   BP Readings from Last 3 Encounters:   03/26/25 (!) 131/92   03/09/25 (!) 155/99   11/06/24 (!) 158/98   He reports BP readings have been good and feeling well  Follow low sodium diet, < 2 gm/day (avoid high salty foods such as processed meats/ sausage/grimaldo/ sandwich  meat, chips, pickles, cheese, crackers and soft drinks/ electrolyte replacement drinks).  Avoid tobacco/ alcohol use  Educated on health benefits of at least 5 days/ week of 30 minutes moderate intensity exercise (brisk walking) and 2 or more days/ week of muscle strength activities  Daily ASA 81 mg for CV prevention  Continue current medication regimen           Relevant Orders    Comprehensive Metabolic Panel    Hemoglobin A1C    Lipid Panel    Microalbumin/Creatinine Ratio, Urine    Vitamin D       Endocrine    Type 2 diabetes mellitus without complication, without long-term current use of insulin - Primary    Current Assessment & Plan   Lab Results   Component Value Date    HGBA1C 7.4 (H) 03/26/2025       CBGs: 123, 130- fasting; 210, 200 after eating  Hypoglycemia episodes: denies  Microalbumin:   Lab Results   Component Value Date    MICALBCREAT 16.0 11/06/2024     Educated on ADA diet: eliminate/decrease high carbohydrate foods (rice, pasta, bread, potatoes (french fries, chips), candy, sweets, fruit juices, canned fruit, junk food, crackers, carbonated beverages)  Educated on health benefits of at least 5 days/ week of 30 minutes moderate intensity exercise (brisk walking) and 2 or more days/ week of muscle strength activities  Avoid alcohol or tobacco use  Eye exam: due  Foot exam: due   Per recommendations, continue with ACEI/ARB, Daily ASA 81 mg for CV prevention, Statin therapy  DC metformin- causing diarrhea and pt    Currently on Mounjaro 2.5 mg q7d, okay to increase to Mounjaro 5 mg q7d as pt is tolerating and without hypoglycemia           Relevant Medications    tirzepatide (MOUNJARO) 5 mg/0.5 mL PnIj    Other Relevant Orders    Comprehensive Metabolic Panel    Hemoglobin A1C    Lipid Panel    Microalbumin/Creatinine Ratio, Urine    Vitamin D    Diabetic Eye Screening Photo    Vitamin D deficiency    Current Assessment & Plan   Lab Results   Component Value Date    [id number] 15 (L)  11/06/2024     Educated on increasing foods high in Vitamin D such as mushrooms, fish oil, cod liver, salmon, tuna, egg yolks, milk fortified with Vitamin D and foods fortified with Vitamin D such as cereals and oatmeal.  Daily supplementation of Vitamin D 2000 IU daily in addition to Calcium supplementation 1000 mg- 1200 mg daily.  Recheck level with next f/u         Relevant Orders    Comprehensive Metabolic Panel    Vitamin D         This service was not originating from a related E/M service provided within the previous 7 days nor will  to an E/M service or procedure within the next 24 hours or my soonest available appointment.  Prevailing standard of care was able to be met in this audio-only visit.

## 2025-05-08 NOTE — ASSESSMENT & PLAN NOTE
Lab Results   Component Value Date    [id number] 15 (L) 11/06/2024     Educated on increasing foods high in Vitamin D such as mushrooms, fish oil, cod liver, salmon, tuna, egg yolks, milk fortified with Vitamin D and foods fortified with Vitamin D such as cereals and oatmeal.  Daily supplementation of Vitamin D 2000 IU daily in addition to Calcium supplementation 1000 mg- 1200 mg daily.  Recheck level with next f/u

## 2025-05-08 NOTE — ASSESSMENT & PLAN NOTE
Lab Results   Component Value Date    HGBA1C 7.4 (H) 03/26/2025       CBGs: 123, 130- fasting; 210, 200 after eating  Hypoglycemia episodes: denies  Microalbumin:   Lab Results   Component Value Date    MICALBCREAT 16.0 11/06/2024     Educated on ADA diet: eliminate/decrease high carbohydrate foods (rice, pasta, bread, potatoes (french fries, chips), candy, sweets, fruit juices, canned fruit, junk food, crackers, carbonated beverages)  Educated on health benefits of at least 5 days/ week of 30 minutes moderate intensity exercise (brisk walking) and 2 or more days/ week of muscle strength activities  Avoid alcohol or tobacco use  Eye exam: due  Foot exam: due   Per recommendations, continue with ACEI/ARB, Daily ASA 81 mg for CV prevention, Statin therapy  DC metformin- causing diarrhea and pt    Currently on Mounjaro 2.5 mg q7d, okay to increase to Mounjaro 5 mg q7d as pt is tolerating and without hypoglycemia

## 2025-06-05 DIAGNOSIS — E11.9 TYPE 2 DIABETES MELLITUS WITHOUT COMPLICATION, WITHOUT LONG-TERM CURRENT USE OF INSULIN: ICD-10-CM

## 2025-06-05 DIAGNOSIS — I10 PRIMARY HYPERTENSION: ICD-10-CM

## 2025-06-06 RX ORDER — TIRZEPATIDE 5 MG/.5ML
5 INJECTION, SOLUTION SUBCUTANEOUS
Qty: 4 PEN | Refills: 1 | OUTPATIENT
Start: 2025-06-06

## 2025-06-06 RX ORDER — AMLODIPINE BESYLATE 10 MG/1
10 TABLET ORAL DAILY
Qty: 90 TABLET | Refills: 1 | OUTPATIENT
Start: 2025-06-06 | End: 2026-06-06

## 2025-06-12 ENCOUNTER — PATIENT MESSAGE (OUTPATIENT)
Dept: INTERNAL MEDICINE | Facility: CLINIC | Age: 50
End: 2025-06-12
Payer: COMMERCIAL

## 2025-06-12 DIAGNOSIS — E11.9 TYPE 2 DIABETES MELLITUS WITHOUT COMPLICATION, WITHOUT LONG-TERM CURRENT USE OF INSULIN: ICD-10-CM

## 2025-06-12 RX ORDER — TIRZEPATIDE 5 MG/.5ML
5 INJECTION, SOLUTION SUBCUTANEOUS
Qty: 4 PEN | Refills: 1 | OUTPATIENT
Start: 2025-06-12

## 2025-06-27 ENCOUNTER — LAB VISIT (OUTPATIENT)
Dept: LAB | Facility: HOSPITAL | Age: 50
End: 2025-06-27
Attending: NURSE PRACTITIONER
Payer: COMMERCIAL

## 2025-06-27 DIAGNOSIS — E11.9 TYPE 2 DIABETES MELLITUS WITHOUT COMPLICATION, WITHOUT LONG-TERM CURRENT USE OF INSULIN: ICD-10-CM

## 2025-06-27 DIAGNOSIS — I10 PRIMARY HYPERTENSION: ICD-10-CM

## 2025-06-27 DIAGNOSIS — E55.9 VITAMIN D DEFICIENCY: ICD-10-CM

## 2025-06-27 LAB
25(OH)D3+25(OH)D2 SERPL-MCNC: 16 NG/ML (ref 30–80)
ALBUMIN SERPL-MCNC: 3.5 G/DL (ref 3.5–5)
ALBUMIN/CREAT UR: 9.4 MG/GM CR (ref 0–30)
ALBUMIN/GLOB SERPL: 0.9 RATIO (ref 1.1–2)
ALP SERPL-CCNC: 57 UNIT/L (ref 40–150)
ALT SERPL-CCNC: 18 UNIT/L (ref 0–55)
ANION GAP SERPL CALC-SCNC: 5 MEQ/L
AST SERPL-CCNC: 18 UNIT/L (ref 11–45)
BILIRUB SERPL-MCNC: 0.3 MG/DL
BUN SERPL-MCNC: 11.1 MG/DL (ref 8.4–25.7)
CALCIUM SERPL-MCNC: 8.7 MG/DL (ref 8.4–10.2)
CHLORIDE SERPL-SCNC: 103 MMOL/L (ref 98–107)
CHOLEST SERPL-MCNC: 172 MG/DL
CHOLEST/HDLC SERPL: 5 {RATIO} (ref 0–5)
CO2 SERPL-SCNC: 25 MMOL/L (ref 22–29)
CREAT SERPL-MCNC: 0.83 MG/DL (ref 0.72–1.25)
CREAT UR-MCNC: 97.5 MG/DL (ref 63–166)
CREAT/UREA NIT SERPL: 13
EST. AVERAGE GLUCOSE BLD GHB EST-MCNC: 142.7 MG/DL
GFR SERPLBLD CREATININE-BSD FMLA CKD-EPI: >60 ML/MIN/1.73/M2
GLOBULIN SER-MCNC: 4 GM/DL (ref 2.4–3.5)
GLUCOSE SERPL-MCNC: 128 MG/DL (ref 74–100)
HBA1C MFR BLD: 6.6 %
HDLC SERPL-MCNC: 35 MG/DL (ref 35–60)
LDLC SERPL CALC-MCNC: 64 MG/DL (ref 50–140)
MICROALBUMIN UR-MCNC: 9.2 UG/ML
POTASSIUM SERPL-SCNC: 3.8 MMOL/L (ref 3.5–5.1)
PROT SERPL-MCNC: 7.5 GM/DL (ref 6.4–8.3)
SODIUM SERPL-SCNC: 133 MMOL/L (ref 136–145)
TRIGL SERPL-MCNC: 365 MG/DL (ref 34–140)
VLDLC SERPL CALC-MCNC: 73 MG/DL

## 2025-06-27 PROCEDURE — 36415 COLL VENOUS BLD VENIPUNCTURE: CPT

## 2025-06-27 PROCEDURE — 82306 VITAMIN D 25 HYDROXY: CPT

## 2025-06-27 PROCEDURE — 82570 ASSAY OF URINE CREATININE: CPT

## 2025-06-27 PROCEDURE — 80061 LIPID PANEL: CPT

## 2025-06-27 PROCEDURE — 80053 COMPREHEN METABOLIC PANEL: CPT

## 2025-06-27 PROCEDURE — 83036 HEMOGLOBIN GLYCOSYLATED A1C: CPT

## 2025-06-30 ENCOUNTER — OFFICE VISIT (OUTPATIENT)
Dept: INTERNAL MEDICINE | Facility: CLINIC | Age: 50
End: 2025-06-30
Payer: COMMERCIAL

## 2025-06-30 DIAGNOSIS — E55.9 VITAMIN D DEFICIENCY: ICD-10-CM

## 2025-06-30 DIAGNOSIS — Z00.00 WELLNESS EXAMINATION: ICD-10-CM

## 2025-06-30 DIAGNOSIS — E87.1 HYPONATREMIA: ICD-10-CM

## 2025-06-30 DIAGNOSIS — E66.812 CLASS 2 SEVERE OBESITY DUE TO EXCESS CALORIES WITH SERIOUS COMORBIDITY AND BODY MASS INDEX (BMI) OF 38.0 TO 38.9 IN ADULT: ICD-10-CM

## 2025-06-30 DIAGNOSIS — E66.01 CLASS 2 SEVERE OBESITY DUE TO EXCESS CALORIES WITH SERIOUS COMORBIDITY AND BODY MASS INDEX (BMI) OF 38.0 TO 38.9 IN ADULT: ICD-10-CM

## 2025-06-30 DIAGNOSIS — E11.9 TYPE 2 DIABETES MELLITUS WITHOUT COMPLICATION, WITHOUT LONG-TERM CURRENT USE OF INSULIN: Primary | ICD-10-CM

## 2025-06-30 DIAGNOSIS — E78.1 HYPERTRIGLYCERIDEMIA: ICD-10-CM

## 2025-06-30 DIAGNOSIS — Z12.5 PROSTATE CANCER SCREENING: ICD-10-CM

## 2025-06-30 PROCEDURE — 1160F RVW MEDS BY RX/DR IN RCRD: CPT | Mod: CPTII,95,, | Performed by: NURSE PRACTITIONER

## 2025-06-30 PROCEDURE — 3061F NEG MICROALBUMINURIA REV: CPT | Mod: CPTII,95,, | Performed by: NURSE PRACTITIONER

## 2025-06-30 PROCEDURE — 3044F HG A1C LEVEL LT 7.0%: CPT | Mod: CPTII,95,, | Performed by: NURSE PRACTITIONER

## 2025-06-30 PROCEDURE — 3066F NEPHROPATHY DOC TX: CPT | Mod: CPTII,95,, | Performed by: NURSE PRACTITIONER

## 2025-06-30 PROCEDURE — 98006 SYNCH AUDIO-VIDEO EST MOD 30: CPT | Mod: 95,,, | Performed by: NURSE PRACTITIONER

## 2025-06-30 PROCEDURE — 1159F MED LIST DOCD IN RCRD: CPT | Mod: CPTII,95,, | Performed by: NURSE PRACTITIONER

## 2025-06-30 RX ORDER — CHOLECALCIFEROL (VITAMIN D3) 1250 MCG
1250 TABLET ORAL
Qty: 12 TABLET | Refills: 1 | Status: SHIPPED | OUTPATIENT
Start: 2025-06-30

## 2025-06-30 RX ORDER — TIRZEPATIDE 5 MG/.5ML
5 INJECTION, SOLUTION SUBCUTANEOUS
Qty: 4 PEN | Refills: 6 | Status: SHIPPED | OUTPATIENT
Start: 2025-06-30

## 2025-06-30 RX ORDER — ATORVASTATIN CALCIUM 20 MG/1
20 TABLET, FILM COATED ORAL NIGHTLY
Qty: 90 TABLET | Refills: 1 | Status: SHIPPED | OUTPATIENT
Start: 2025-06-30 | End: 2026-06-30

## 2025-06-30 NOTE — ASSESSMENT & PLAN NOTE
Lab Results   Component Value Date    CHOL 172 06/27/2025     Lab Results   Component Value Date    HDL 35 06/27/2025     Lab Results   Component Value Date    TRIG 365 (H) 06/27/2025     Lab Results   Component Value Date    LDL 64.00 06/27/2025     Avoid tobacco/ alcohol  Follow low fat/low cholesterol diet such as avoid/ decrease grimaldo, sausage, fried foods, cookies, cakes, chips, cheese, whole milk, butter, mayonnaise. Add olive oil, avocados, lean meats, fresh fruits/ vegetables, heart healthy nuts to diet.  Educated on health benefits of exercise 5 days/ week of at least 30 minutes moderate intensity exercise (brisk walking) and 2 days/ week of muscle strength activities  Begin atorvastatin 20 mg qhs, f/u with labs

## 2025-06-30 NOTE — ASSESSMENT & PLAN NOTE
Lab Results   Component Value Date    [id number] 16 (L) 06/27/2025   Not on daily supplement  Rx 07680 IU once weekly x 12 weeks, 1 refill provided  Educated on increasing foods high in Vitamin D such as mushrooms, fish oil, cod liver, salmon, tuna, egg yolks, milk fortified with Vitamin D and foods fortified with Vitamin D such as cereals and oatmeal.  Daily supplementation of Vitamin D 2000 IU daily in addition to Calcium supplementation 1000 mg- 1200 mg daily.  Recheck level with next f/u

## 2025-06-30 NOTE — PROGRESS NOTES
Answers submitted by the patient for this visit:  Review of Systems Questionnaire (Submitted on 6/30/2025)  activity change: No  unexpected weight change: No  neck pain: No  hearing loss: No  rhinorrhea: No  trouble swallowing: No  eye discharge: No  visual disturbance: No  chest tightness: No  wheezing: No  chest pain: No  palpitations: No  blood in stool: No  constipation: No  vomiting: No  diarrhea: No  polydipsia: No  polyuria: No  difficulty urinating: No  urgency: No  hematuria: No  joint swelling: No  arthralgias: No  headaches: Yes  weakness: No  confusion: No  dysphoric mood: No    The patient location is: work  The chief complaint leading to consultation is: f/u lab review     Visit type: audio only    Face to Face time with patient: 11 minutes   20 minutes of total time spent on the encounter, which includes face to face time and non-face to face time preparing to see the patient (eg, review of tests), Obtaining and/or reviewing separately obtained history, Documenting clinical information in the electronic or other health record, Independently interpreting results (not separately reported) and communicating results to the patient/family/caregiver, or Care coordination (not separately reported).         Each patient to whom he or she provides medical services by telemedicine is:  (1) informed of the relationship between the physician and patient and the respective role of any other health care provider with respect to management of the patient; and (2) notified that he or she may decline to receive medical services by telemedicine and may withdraw from such care at any time.    Notes: Pt for lab review. A1c 6.6%. CBGs 115-120 fasting and around 140 after meals. Wt loss but unsure how much. Vit D 16. Not on supplement currently. Trig 365. Drinks alcohol 3-4 times per week; 6 pack with each episode. Denies any other concerns/ complaints.     Medication List with Changes/Refills   New Medications     ATORVASTATIN (LIPITOR) 20 MG TABLET    Take 1 tablet (20 mg total) by mouth every evening.   Current Medications    AMLODIPINE (NORVASC) 10 MG TABLET    Take 1 tablet (10 mg total) by mouth once daily.    BLOOD SUGAR DIAGNOSTIC STRP    To check BG one times daily, to use with insurance preferred meter    BLOOD-GLUCOSE METER KIT    To check BG one times daily, to use with insurance preferred meter    LANCETS MISC    To check BG one times daily, to use with insurance preferred meter    ONETOUCH VERIO FLEX METER MISC    use to check blood glucose once DAILY AS DIRECTED   Changed and/or Refilled Medications    Modified Medication Previous Medication    CHOLECALCIFEROL, VITAMIN D3, 1,250 MCG (50,000 UNIT) TAB cholecalciferol, vitamin D3, 1,250 mcg (50,000 unit) Tab       Take 1,250 mcg by mouth every 7 days.    Take 1,250 mcg by mouth every 7 days.    TIRZEPATIDE (MOUNJARO) 5 MG/0.5 ML PNIJ tirzepatide (MOUNJARO) 5 mg/0.5 mL PnIj       Inject 5 mg into the skin every 7 days.    Inject 5 mg into the skin every 7 days.     Problem List Items Addressed This Visit          Cardiac/Vascular    Hypertriglyceridemia    Current Assessment & Plan   Lab Results   Component Value Date    CHOL 172 06/27/2025     Lab Results   Component Value Date    HDL 35 06/27/2025     Lab Results   Component Value Date    TRIG 365 (H) 06/27/2025     Lab Results   Component Value Date    LDL 64.00 06/27/2025     Avoid tobacco/ alcohol  Follow low fat/low cholesterol diet such as avoid/ decrease grimaldo, sausage, fried foods, cookies, cakes, chips, cheese, whole milk, butter, mayonnaise. Add olive oil, avocados, lean meats, fresh fruits/ vegetables, heart healthy nuts to diet.  Educated on health benefits of exercise 5 days/ week of at least 30 minutes moderate intensity exercise (brisk walking) and 2 days/ week of muscle strength activities  Begin atorvastatin 20 mg qhs, f/u with labs           Relevant Medications    atorvastatin (LIPITOR) 20 MG  tablet    Other Relevant Orders    CBC Auto Differential    Comprehensive Metabolic Panel    Hemoglobin A1C    Lipid Panel       Endocrine    Class 2 severe obesity with serious comorbidity and body mass index (BMI) of 38.0 to 38.9 in adult    Current Assessment & Plan   BMI 39.38  Need updated wt/ BMI check  Educated on increased risk of disease s/t obesity.  Educated on health benefits of at least 5 days/ week of 30 minutes moderate intensity exercise (brisk walking) and 2 or more days/ week of muscle strength activities (as tolerated).  Eat well balanced diet of fresh fruits/ vegetables, whole grains, lean meats and limit high carbohydrate foods.            Relevant Orders    CBC Auto Differential    Comprehensive Metabolic Panel    Hemoglobin A1C    Lipid Panel    TSH    Vitamin D    Type 2 diabetes mellitus without complication, without long-term current use of insulin - Primary    Current Assessment & Plan   Lab Results   Component Value Date    HGBA1C 6.6 06/27/2025     CBGs: 115-120 fasting; 140 after meals  Hypoglycemia episodes: denies  Microalbumin:   Lab Results   Component Value Date    MICALBCREAT 9.4 06/27/2025     Educated on ADA diet: eliminate/decrease high carbohydrate foods (rice, pasta, bread, potatoes (french fries, chips), candy, sweets, fruit juices, canned fruit, junk food, crackers, carbonated beverages)  Educated on health benefits of at least 5 days/ week of 30 minutes moderate intensity exercise (brisk walking) and 2 or more days/ week of muscle strength activities  Avoid alcohol or tobacco use  Eye exam: due  Foot exam: due   Per recommendations, continue with ACEI/ARB, Daily ASA 81 mg for CV prevention, Statin therapy  DC metformin- causing diarrhea and pt    Continue Mounjaro 5 mg q7d           Relevant Medications    tirzepatide (MOUNJARO) 5 mg/0.5 mL PnIj    Other Relevant Orders    CBC Auto Differential    Comprehensive Metabolic Panel    Hemoglobin A1C    Lipid Panel     Microalbumin/Creatinine Ratio, Urine    TSH    Vitamin D    Vitamin D deficiency    Current Assessment & Plan   Lab Results   Component Value Date    [id number] 16 (L) 06/27/2025   Not on daily supplement  Rx 71092 IU once weekly x 12 weeks, 1 refill provided  Educated on increasing foods high in Vitamin D such as mushrooms, fish oil, cod liver, salmon, tuna, egg yolks, milk fortified with Vitamin D and foods fortified with Vitamin D such as cereals and oatmeal.  Daily supplementation of Vitamin D 2000 IU daily in addition to Calcium supplementation 1000 mg- 1200 mg daily.  Recheck level with next f/u         Relevant Medications    cholecalciferol, vitamin D3, 1,250 mcg (50,000 unit) Tab    Other Relevant Orders    Comprehensive Metabolic Panel    TSH    Vitamin D     Other Visit Diagnoses         Hyponatremia      Pt drinks alcohol 3-4 x wk. Encouraged weaning. Pt asymptomatic. Recheck with f/u.    Relevant Orders    Comprehensive Metabolic Panel      Wellness examination      F/u 6 mo     Relevant Orders    CBC Auto Differential    Comprehensive Metabolic Panel    Hemoglobin A1C    Lipid Panel    Microalbumin/Creatinine Ratio, Urine    TSH    Vitamin D    PSA, Screening      Prostate cancer screening     F/u 6 mo; last PSA 0.84 11/6/24     Relevant Orders    PSA, Screening        Follow up in about 6 months (around 12/30/2025).

## 2025-06-30 NOTE — ASSESSMENT & PLAN NOTE
Lab Results   Component Value Date    HGBA1C 6.6 06/27/2025     CBGs: 115-120 fasting; 140 after meals  Hypoglycemia episodes: denies  Microalbumin:   Lab Results   Component Value Date    MICALBCREAT 9.4 06/27/2025     Educated on ADA diet: eliminate/decrease high carbohydrate foods (rice, pasta, bread, potatoes (french fries, chips), candy, sweets, fruit juices, canned fruit, junk food, crackers, carbonated beverages)  Educated on health benefits of at least 5 days/ week of 30 minutes moderate intensity exercise (brisk walking) and 2 or more days/ week of muscle strength activities  Avoid alcohol or tobacco use  Eye exam: due  Foot exam: due   Per recommendations, continue with ACEI/ARB, Daily ASA 81 mg for CV prevention, Statin therapy  DC metformin- causing diarrhea and pt    Continue Mounjaro 5 mg q7d

## 2025-06-30 NOTE — ASSESSMENT & PLAN NOTE
BMI 39.38  Need updated wt/ BMI check  Educated on increased risk of disease s/t obesity.  Educated on health benefits of at least 5 days/ week of 30 minutes moderate intensity exercise (brisk walking) and 2 or more days/ week of muscle strength activities (as tolerated).  Eat well balanced diet of fresh fruits/ vegetables, whole grains, lean meats and limit high carbohydrate foods.

## 2025-07-15 ENCOUNTER — PATIENT MESSAGE (OUTPATIENT)
Facility: CLINIC | Age: 50
End: 2025-07-15
Payer: COMMERCIAL

## 2025-07-15 NOTE — Clinical Note
Statin Therapy for Prevention of CVD in Patients with Diabetes Please review pt for statin. Current dx(s) triggers requirement for Low Dose statin Rx. If patient has statin intolerance, please use any of the following as/if appropriate:  ·        M79.10 Myalgia, unspecified site ·        M60.9 Myositis ·        G72.9 Myopathy ·        G72.0 Drug- induced myopathy · N18.6 ESRD · K74.60 Cirrhosis · M62.82 Rhabdomyolysis · Z51.5 Hospice/ Palliative Care *these are some, not all exclusion dx Exclusions: must be documented annually as an active problem for patient to be and remain excluded - Starts over every January 1 (statin intolerance does not work) If last visit less than 30 days, you can addendum; code will drop at next visit.  Thank you.
No

## 2025-07-22 ENCOUNTER — OFFICE VISIT (OUTPATIENT)
Dept: FAMILY MEDICINE | Facility: CLINIC | Age: 50
End: 2025-07-22
Payer: COMMERCIAL

## 2025-07-22 VITALS
BODY MASS INDEX: 37.65 KG/M2 | WEIGHT: 263 LBS | SYSTOLIC BLOOD PRESSURE: 136 MMHG | DIASTOLIC BLOOD PRESSURE: 87 MMHG | HEART RATE: 80 BPM | TEMPERATURE: 99 F | HEIGHT: 70 IN

## 2025-07-22 DIAGNOSIS — L57.0 AK (ACTINIC KERATOSIS): ICD-10-CM

## 2025-07-22 PROCEDURE — 99214 OFFICE O/P EST MOD 30 MIN: CPT | Mod: PBBFAC

## 2025-07-22 NOTE — PROGRESS NOTES
"[facility] Minor Surgery Note    ID:  Yvonne Pierre   MRN:  [id number]     7/22/2025    Chief Complaint: Mole on nose    History of Present Illness:  Yvonne Pierre is a 49 y.o. male who presents to [facility] clinic for two small moles on right side of his nose. Pt states one of his moles has been there for 14 years with no significant change overtime and the other recently came one month ago. He reports very occasional itchiness. The lesions bleed whenever he scrapes it. Denies pain, redness, or discharge.       Review of patient's allergies indicates:  No Known Allergies      Review of Systems:  As per HPI      Physical Exam:  BP (!) 142/80 (Patient Position: Sitting)   Pulse 80   Temp 98.6 °F (37 °C)   Ht 5' 9.69" (1.77 m)   Wt 119.3 kg (263 lb)   BMI 38.08 kg/m²   Physical Exam   General: No acute distress.  Integumentary:   Nose: 2 very small, raised dark-colored skin lesion on right side on nose. Both symmetric, regular borders, <6mm.          Assessment/Plan:    Problem #1: Benign nevi/skin lesion   -49 y.o. male presenting with two very small skin dark-colored skin lesions on right side of nose. Skin lesions are symmetric, very small in size, with regular border. Likely are benign skin lesions or nevi. Procedures will likely not provide significant benefit. Will continue to monitor.      No follow-ups on file.    Kishor Green MD  [facility], [facility]    "

## 2025-07-24 NOTE — PROGRESS NOTES
I saw and evaluated the patient and was present for the key portions of the exam and separately billed procedures, if any. I have reveiwed and agree with the resident's findings, including all diagnostic interpretations and plans as written. Option for biopsy discussed- pt elects to continue to monitor at this time.

## 2025-08-29 ENCOUNTER — PATIENT MESSAGE (OUTPATIENT)
Dept: INTERNAL MEDICINE | Facility: CLINIC | Age: 50
End: 2025-08-29
Payer: COMMERCIAL

## 2025-08-29 DIAGNOSIS — E11.9 TYPE 2 DIABETES MELLITUS WITHOUT COMPLICATION, WITHOUT LONG-TERM CURRENT USE OF INSULIN: ICD-10-CM

## 2025-08-29 RX ORDER — TIRZEPATIDE 5 MG/.5ML
5 INJECTION, SOLUTION SUBCUTANEOUS
Qty: 12 PEN | Refills: 1 | Status: SHIPPED | OUTPATIENT
Start: 2025-08-29